# Patient Record
Sex: FEMALE | Race: WHITE | Employment: FULL TIME | URBAN - METROPOLITAN AREA
[De-identification: names, ages, dates, MRNs, and addresses within clinical notes are randomized per-mention and may not be internally consistent; named-entity substitution may affect disease eponyms.]

---

## 2017-05-05 ENCOUNTER — TRANSFERRED RECORDS (OUTPATIENT)
Dept: HEALTH INFORMATION MANAGEMENT | Facility: CLINIC | Age: 22
End: 2017-05-05

## 2017-05-05 LAB — PAP-ABSTRACT: NORMAL

## 2018-05-22 ENCOUNTER — HEALTH MAINTENANCE LETTER (OUTPATIENT)
Age: 23
End: 2018-05-22

## 2018-08-03 ENCOUNTER — OFFICE VISIT (OUTPATIENT)
Dept: FAMILY MEDICINE | Facility: CLINIC | Age: 23
End: 2018-08-03
Payer: COMMERCIAL

## 2018-08-03 VITALS
SYSTOLIC BLOOD PRESSURE: 122 MMHG | HEART RATE: 110 BPM | BODY MASS INDEX: 30.08 KG/M2 | DIASTOLIC BLOOD PRESSURE: 54 MMHG | OXYGEN SATURATION: 99 % | TEMPERATURE: 97.7 F | HEIGHT: 66 IN | WEIGHT: 187.2 LBS

## 2018-08-03 DIAGNOSIS — Z23 ENCOUNTER FOR IMMUNIZATION: ICD-10-CM

## 2018-08-03 DIAGNOSIS — E28.2 PCOS (POLYCYSTIC OVARIAN SYNDROME): Primary | ICD-10-CM

## 2018-08-03 DIAGNOSIS — E66.811 CLASS 1 OBESITY DUE TO EXCESS CALORIES WITHOUT SERIOUS COMORBIDITY WITH BODY MASS INDEX (BMI) OF 30.0 TO 30.9 IN ADULT: ICD-10-CM

## 2018-08-03 DIAGNOSIS — Z30.41 ORAL CONTRACEPTIVE PILL SURVEILLANCE: ICD-10-CM

## 2018-08-03 DIAGNOSIS — F41.9 ANXIETY: ICD-10-CM

## 2018-08-03 DIAGNOSIS — E66.09 CLASS 1 OBESITY DUE TO EXCESS CALORIES WITHOUT SERIOUS COMORBIDITY WITH BODY MASS INDEX (BMI) OF 30.0 TO 30.9 IN ADULT: ICD-10-CM

## 2018-08-03 PROCEDURE — 90471 IMMUNIZATION ADMIN: CPT | Performed by: FAMILY MEDICINE

## 2018-08-03 PROCEDURE — 90636 HEP A/HEP B VACC ADULT IM: CPT | Performed by: FAMILY MEDICINE

## 2018-08-03 PROCEDURE — 99214 OFFICE O/P EST MOD 30 MIN: CPT | Mod: 25 | Performed by: FAMILY MEDICINE

## 2018-08-03 RX ORDER — ESCITALOPRAM OXALATE 10 MG/1
5 TABLET ORAL DAILY
Qty: 45 TABLET | Refills: 3 | Status: SHIPPED | OUTPATIENT
Start: 2018-08-03 | End: 2019-02-26

## 2018-08-03 RX ORDER — DESOGESTREL AND ETHINYL ESTRADIOL 0.15-0.03
1 KIT ORAL
COMMUNITY
Start: 2018-04-16 | End: 2018-08-03

## 2018-08-03 RX ORDER — DESOGESTREL AND ETHINYL ESTRADIOL 0.15-0.03
1 KIT ORAL DAILY
Qty: 84 TABLET | Refills: 3 | Status: SHIPPED | OUTPATIENT
Start: 2018-08-03 | End: 2019-02-26

## 2018-08-03 RX ORDER — ESCITALOPRAM OXALATE 10 MG/1
5 TABLET ORAL
COMMUNITY
Start: 2018-04-16 | End: 2018-08-03

## 2018-08-03 RX ORDER — ALBUTEROL SULFATE 90 UG/1
2 AEROSOL, METERED RESPIRATORY (INHALATION)
COMMUNITY
Start: 2017-05-28 | End: 2018-08-03

## 2018-08-03 ASSESSMENT — PAIN SCALES - GENERAL: PAINLEVEL: NO PAIN (0)

## 2018-08-03 NOTE — MR AVS SNAPSHOT
"              After Visit Summary   8/3/2018    Neda Morley    MRN: [id number]           Patient Information     Date Of Birth          1995        Visit Information        Provider Department      8/3/2018 9:00 AM Mis Denny MD [facility]        Today's Diagnoses     Encounter for routine adult health examination without abnormal findings    -  1    PCOS (polycystic ovarian syndrome)        Anxiety        Oral contraceptive pill surveillance           Follow-ups after your visit        Your next 10 appointments already scheduled     Sep 04, 2018  5:00 PM CDT   Nurse Only with AGUSTO ORELLANA MA   [facility] ([facility]    [address] [place] [id number] [phone]              Who to contact     If you have questions or need follow up information about today's clinic visit or your schedule please contact [facility] directly at [phone].  Normal or non-critical lab and imaging results will be communicated to you by MyChart, letter or phone within 4 business days after the clinic has received the results. If you do not hear from us within 7 days, please contact the clinic through MyChart or phone. If you have a critical or abnormal lab result, we will notify you by phone as soon as possible.  Submit refill requests through [employer] or call your pharmacy and they will forward the refill request to us. Please allow 3 business days for your refill to be completed.          Additional Information About Your Visit        Care EveryWhere ID     This is your Care EveryWhere ID. This could be used by other organizations to access your [place] medical records  [id number]        Your Vitals Were     Pulse Temperature Height Last Period Pulse Oximetry Breastfeeding?    110 97.7  F (36.5  C) (Temporal) 5' 6\" (1.676 m) 07/10/2018 (Approximate) 99% No    BMI (Body Mass Index)                   30.21 kg/m2            " Blood Pressure from Last 3 Encounters:   08/03/18 122/54    Weight from Last 3 Encounters:   08/03/18 187 lb 3.2 oz (84.9 kg)              Today, you had the following     No orders found for display         Today's Medication Changes          These changes are accurate as of 8/3/18  9:54 AM.  If you have any questions, ask your nurse or doctor.               These medicines have changed or have updated prescriptions.        Dose/Directions    desogestrel-ethinyl estradiol 0.15-30 MG-MCG per tablet   Commonly known as:  JULEBER   This may have changed:  when to take this   Used for:  PCOS (polycystic ovarian syndrome), Oral contraceptive pill surveillance   Changed by:  Mis Denny MD        Dose:  1 tablet   Take 1 tablet by mouth daily   Quantity:  84 tablet   Refills:  3       escitalopram 10 MG tablet   Commonly known as:  LEXAPRO   This may have changed:  when to take this   Used for:  Anxiety   Changed by:  Mis Denny MD        Dose:  5 mg   Take 0.5 tablets (5 mg) by mouth daily   Quantity:  45 tablet   Refills:  3            Where to get your medicines      These medications were sent to [address]  [address], [facility] [address]     Phone:  [phone]     desogestrel-ethinyl estradiol 0.15-30 MG-MCG per tablet    escitalopram 10 MG tablet                Primary Care Provider Office Phone # Fax #    Mis Denny [phone] [address] [facility]   [facility] [address]        Equal Access to Services     BEBO NICE AH: Hadii estrellita ku hadasho Sosuali, waaxda luqadaha, qaybta kaalmada adeegyada, daysi sun. So [facility] [phone].    ATENCIÓN: Si habla español, tiene a diaz disposición servicios gratuitos de asistencia lingüística. Llame al [phone].    We comply with applicable federal civil rights laws and [place] laws. We do not discriminate on the basis of race, color,  national origin, age, disability, sex, sexual orientation, or gender identity.            Thank you!     Thank you for choosing [facility]  for your care. Our goal is always to provide you with excellent care. Hearing back from our patients is one way we can continue to improve our services. Please take a few minutes to complete the written survey that you may receive in the mail after your visit with us. Thank you!             Your Updated Medication List - Protect others around you: Learn how to safely use, store and throw away your medicines at www.disposemymeds.org.          This list is accurate as of 8/3/18  9:54 AM.  Always use your most recent med list.                   Brand Name Dispense Instructions for use Diagnosis    desogestrel-ethinyl estradiol 0.15-30 MG-MCG per tablet    JULEBER    84 tablet    Take 1 tablet by mouth daily    PCOS (polycystic ovarian syndrome), Oral contraceptive pill surveillance       escitalopram 10 MG tablet    LEXAPRO    45 tablet    Take 0.5 tablets (5 mg) by mouth daily    Anxiety

## 2018-08-08 PROBLEM — F41.9 ANXIETY: Status: ACTIVE | Noted: 2018-08-08

## 2018-08-08 PROBLEM — Z30.41 ORAL CONTRACEPTIVE PILL SURVEILLANCE: Status: ACTIVE | Noted: 2018-08-08

## 2018-08-08 PROBLEM — E66.09 CLASS 1 OBESITY DUE TO EXCESS CALORIES WITHOUT SERIOUS COMORBIDITY WITH BODY MASS INDEX (BMI) OF 30.0 TO 30.9 IN ADULT: Status: ACTIVE | Noted: 2018-08-08

## 2018-08-08 PROBLEM — E28.2 PCOS (POLYCYSTIC OVARIAN SYNDROME): Status: ACTIVE | Noted: 2018-08-08

## 2018-08-08 PROBLEM — E66.811 CLASS 1 OBESITY DUE TO EXCESS CALORIES WITHOUT SERIOUS COMORBIDITY WITH BODY MASS INDEX (BMI) OF 30.0 TO 30.9 IN ADULT: Status: ACTIVE | Noted: 2018-08-08

## 2018-08-08 NOTE — PROGRESS NOTES
"SUBJECTIVE:  Neda Morley, a 23 year old female scheduled an appointment to establish care with new MD and to discuss the following issues:     PCOS (polycystic ovarian syndrome)  Anxiety  Oral contraceptive pill surveillance  Encounter for immunization  Class 1 obesity due to excess calories without serious comorbidity with body mass index (BMI) of 30.0 to 30.9 in adult    Medical, social, surgical, and family histories reviewed.    She is a .  Generally healthy. She is in a relationship with 1 partner for about 10 months.  She has never been pregnant, no concerns for or symptoms of STDs.    She is on OCPs, needs a refill. She has done Depo in the past, for over a year, and didn't like the side effects. She stopped it in Jan or Feb and is doing well on OCPs.  Will need a refill.   She is on Lexapro for anxiety, it is working well. She previously used Wellbutrin without relief.      ROS:  Constitutional, neuro, ENT, endocrine, pulmonary, cardiac, gastrointestinal, genitourinary, musculoskeletal, integument and psychiatric systems are negative, except as otherwise noted.    OBJECTIVE:  /54  Pulse 110  Temp 97.7  F (36.5  C) (Temporal)  Ht 5' 6\" (1.676 m)  Wt 187 lb 3.2 oz (84.9 kg)  LMP 07/10/2018 (Approximate)  SpO2 99%  Breastfeeding? No  BMI 30.21 kg/m2  EXAM:  Vitals noted.  Patient alert, oriented, and in no acute distress.   Neck:  Supple without lymphadenopathy, JVD or masses.   CV:  RRR without murmur.   Respiratory:  Lungs clear to auscultation bilaterally.     Orders Placed This Encounter   Procedures     HEPA/HEPB VACCINE ADULT IM     ADMIN 1st VACCINE        ASSESSMENT/PLAN:  (E28.2) PCOS (polycystic ovarian syndrome)  (primary encounter diagnosis)  Comment: stable.    Plan: desogestrel-ethinyl estradiol (JULEBER) 0.15-30        MG-MCG per tablet        Refilled her medication. Encouraged weight loss now and lifelong, no desire for fertility at this time.      (F41.9) " Anxiety  Comment: stable on Lexapro  Plan: escitalopram (LEXAPRO) 10 MG tablet        Refills given.     (Z30.41) Oral contraceptive pill surveillance  Comment: as above. Stable.   Plan: desogestrel-ethinyl estradiol (JULEBER) 0.15-30        MG-MCG per tablet        Refills given. Recommend yearly follow up. Pap due in 2020 per OB/GYN.     (Z23) Encounter for immunization  Comment: see above  Plan: HEPA/HEPB VACCINE ADULT IM, ADMIN 1st VACCINE        Completing Hep A series. At time of visit I was unaware that she has completed Hep B series, so we discussed pros and cons and she opted to receive this. I have since learned that she has completed Hep B series so no further vaccines needed.        (E66.09,  Z68.30) Class 1 obesity due to excess calories without serious comorbidity with body mass index (BMI) of 30.0 to 30.9 in adult  Comment: as above.  Plan: recommend lifetime healthy habits with increased exercise, improved diet for weight loss.      Mis Denny MD

## 2018-09-04 ENCOUNTER — TELEPHONE (OUTPATIENT)
Dept: FAMILY MEDICINE | Facility: CLINIC | Age: 23
End: 2018-09-04

## 2018-09-04 NOTE — TELEPHONE ENCOUNTER
Called to let patient know she didn't need to come in for any immunizations patient is up to date.  Dana Griffin MA 9/4/2018

## 2018-10-19 ENCOUNTER — TELEPHONE (OUTPATIENT)
Dept: FAMILY MEDICINE | Facility: CLINIC | Age: 23
End: 2018-10-19

## 2018-10-19 NOTE — TELEPHONE ENCOUNTER
"Neda Morley is a 23 year old female who calls with heavier than normal bleeding. Pt reports her period started two weeks ago and came back and has been heavy for 3 days. She states it is \"worse than a normal period,\" states saturating a pad an hour, reports a migraine x2 days, feels \"dehyrdated,\" \"groggy,\" and fatigued. Pt then stated she is \"multi-tasking\" and had to go. She states she will call back today around 4:30.   Huddled with Dr. Denny prior to calling patient who stated if patient missed pills she should finish the month of pills and not catch up on missed pills and to advise her that she will not be protected from pregnancy until finishing the next pack of pills and to use condoms during sex for protection.   Unable to complete triage of patient's symptoms and will anticipate call back from patient today.     NURSING PLAN: Huddle with provider, plan includes      Guideline used: Vaginal Bleeding  Telephone Triage Protocols for Nurses, Fifth Edition, Leora Stephenson RN    "

## 2018-10-19 NOTE — TELEPHONE ENCOUNTER
Reason for Call:  Patient missed 3 birth control pills     Detailed comments: Patient missed 3 birth control pills and now has experienced heavier than usual bleeding for 3 days. Patient would like Dr Denny or her nurse to call and discuss.     Phone Number Patient can be reached at: Cell number on file:    Telephone Information:   Mobile [phone]       Best Time: any    Can we leave a detailed message on this number? YES    Call taken on 10/19/2018 at 12:42 PM by Emma Zavala

## 2018-10-19 NOTE — TELEPHONE ENCOUNTER
Pt called back to discuss symptoms discussed in previous triage message. Huddled with Dr. Denny and informed pt that the heavy withdrawal bleed is likely due to missing three pills last week and to use other methods of contraception until the end of the next pill pack. Informed pt to increase fluids as well as she stated she is feeling dehydrated. Pt denies pain now but states the last couple days her cramping was about a 7/10. Instructed pt to increase fluids, rest, and monitor symptoms. If bleeding continues this heavy where she is saturating a pad an hour to seek care in the ED. Pt states she does not have major concerns but wondered why the bleeding was so heavy. Pt agrees to go to ED over the weekend if symptoms persist or worsen.    Haven Stephenson RN

## 2019-01-30 ENCOUNTER — TELEPHONE (OUTPATIENT)
Dept: FAMILY MEDICINE | Facility: CLINIC | Age: 24
End: 2019-01-30

## 2019-01-31 NOTE — TELEPHONE ENCOUNTER
Yes she can have the IUD Placed at that time. She needs to have NO unprotected sex in the 2 weeks prior to appt to have no chance of pregnancy at that time, and will have to take a pregnancy test at that time.   Mis Denny MD

## 2019-01-31 NOTE — TELEPHONE ENCOUNTER
Reason for Call:  Other call back    Detailed comments: Patient has an appt on 2/26/19 for a consult but she would like to have the IUD placed. Patient feels that she doesn't need the consult. Please call her to conform that she can have the IUD placed during the appt.     Phone Number Patient can be reached at: Cell number on file:    Telephone Information:   Mobile [phone]       Best Time: any    Can we leave a detailed message on this number? YES    Call taken on 1/30/2019 at 6:23 PM by Tala Rosales

## 2019-02-26 ENCOUNTER — OFFICE VISIT (OUTPATIENT)
Dept: FAMILY MEDICINE | Facility: CLINIC | Age: 24
End: 2019-02-26
Payer: COMMERCIAL

## 2019-02-26 VITALS
SYSTOLIC BLOOD PRESSURE: 122 MMHG | DIASTOLIC BLOOD PRESSURE: 58 MMHG | TEMPERATURE: 98.6 F | BODY MASS INDEX: 30.73 KG/M2 | HEART RATE: 90 BPM | WEIGHT: 191.2 LBS | HEIGHT: 66 IN | OXYGEN SATURATION: 96 % | RESPIRATION RATE: 14 BRPM

## 2019-02-26 DIAGNOSIS — Z30.430 ENCOUNTER FOR IUD INSERTION: Primary | ICD-10-CM

## 2019-02-26 LAB — HCG UR QL: NEGATIVE

## 2019-02-26 PROCEDURE — 58300 INSERT INTRAUTERINE DEVICE: CPT | Performed by: FAMILY MEDICINE

## 2019-02-26 PROCEDURE — 81025 URINE PREGNANCY TEST: CPT | Performed by: FAMILY MEDICINE

## 2019-02-26 PROCEDURE — 87491 CHLMYD TRACH DNA AMP PROBE: CPT | Performed by: FAMILY MEDICINE

## 2019-02-26 PROCEDURE — 87591 N.GONORRHOEAE DNA AMP PROB: CPT | Performed by: FAMILY MEDICINE

## 2019-02-26 ASSESSMENT — MIFFLIN-ST. JEOR: SCORE: 1649.41

## 2019-02-26 ASSESSMENT — PAIN SCALES - GENERAL: PAINLEVEL: NO PAIN (0)

## 2019-02-26 NOTE — PROGRESS NOTES
"IUD Insertion:  CONSULT:    Is a pregnancy test required: Yes.  Was it positive or negative?  Negative  Was a consent obtained?  Yes    Subjective: Neda Morley is a 23 year old  presents for IUD and desires Mirena type IUD.  We discussed all available options including Anna, Kyleena, Mirena, and Paragard.     Patient has been given the opportunity to ask questions about all forms of birth control, including all options appropriate for Neda Morley. Discussed that no method of birth control, except abstinence is 100% effective against pregnancy or sexually transmitted infection. She is forgetful about taking pills, and had side effects from Depo Provera.     Neda Morley understands she may have the IUD removed at any time. IUD should be removed by a health care provider.    The entire insertion procedure was reviewed with the patient, including care after placement.    No LMP recorded (lmp unknown). Patient is not currently having periods (Reason: Irregular Periods). Has current contraception. No allergy to betadine or shellfish. Patient desires STD screening  HCG Qual Urine   Date Value Ref Range Status   2019 Negative NEG^Negative Final     Comment:     This test is for screening purposes.  Results should be interpreted along with   the clinical picture.  Confirmation testing is available if warranted by   ordering DKF722, HCG Quantitative Pregnancy.           /58   Pulse 90   Temp 98.6  F (37  C) (Temporal)   Resp 14   Ht 1.685 m (5' 6.34\")   Wt 86.7 kg (191 lb 3.2 oz)   LMP  (LMP Unknown)   SpO2 96%   Breastfeeding? No   BMI 30.55 kg/m      Vitals noted.  Patient alert, oriented, and in no acute distress.   Abdomen:  Soft, nontender, nondistended with good bowel sounds and no masses or hepatosplenomegaly.   EG/BUS: normal genital architecture without lesions, erythema or abnormal secretions.   Vagina: moist, pink, rugae with physiologic discharge and " secretions  Cervix: nulliparous no lesions and pink, moist, closed, without lesion or CMT  Uterus: midposition, mobile, no pain  Adnexa: within normal limits and no masses, nodularity, tenderness    PROCEDURE NOTE: -- IUD Insertion    Reason for Insertion: contraception    Under sterile technique, cervix was visualized with speculum, genprobe collected, cervix prepped with Betadine solution swab x 3. Tenaculum was placed for stability. The uterus was gently straightened and sounded to 8.0 cm. IUD prepared for placement, and IUD inserted according to 's instructions without difficulty or significant resitance, and deployed at the fundus. The strings were visualized and trimmed to 5.0 cm from the external os. Tenaculum was removed and hemostasis noted. Speculum removed.  Patient tolerated procedure well. She felt faint afterward for a few minutes but was allowed to sit up slowly, was given some juice, crackers and time, and recovered nicely.      Lot # [id number]  Exp: 4/30/21    EBL: minimal    Complications: none    ASSESSMENT:     ICD-10-CM    1. Encounter for IUD insertion Z30.430 HCG Qual, Urine ([id number])     HCG Qual, Urine ([id number])     NEISSERIA GONORRHOEA PCR     CHLAMYDIA TRACHOMATIS PCR     levonorgestrel (MIRENA) 20 MCG/24HR IUD 20 mcg     INSERTION INTRAUTERINE DEVICE        PLAN:    Given 's handouts, including when to have IUD removed, list of danger s/sx, side effects and follow up recommended. Encouraged condom use for prevention of STD. Back up contraception advised for 7 days if progestin method. Advised to call for any fever, for prolonged or severe pain or bleeding, abnormal vaginal discharge, or unable to palpate strings. She was advised to use pain medications (ibuprofen) as needed for mild to moderate pain. Advised to follow-up in clinic in 4-6 weeks for IUD string check if unable to find strings or as directed by provider.     Mis Denny MD

## 2019-02-27 LAB
C TRACH DNA SPEC QL NAA+PROBE: NEGATIVE
N GONORRHOEA DNA SPEC QL NAA+PROBE: NEGATIVE
SPECIMEN SOURCE: NORMAL
SPECIMEN SOURCE: NORMAL

## 2019-03-22 ENCOUNTER — OFFICE VISIT (OUTPATIENT)
Dept: URGENT CARE | Facility: RETAIL CLINIC | Age: 24
End: 2019-03-22
Payer: COMMERCIAL

## 2019-03-22 VITALS
DIASTOLIC BLOOD PRESSURE: 82 MMHG | OXYGEN SATURATION: 96 % | SYSTOLIC BLOOD PRESSURE: 120 MMHG | HEART RATE: 79 BPM | TEMPERATURE: 98.4 F

## 2019-03-22 DIAGNOSIS — H61.23 CERUMINOSIS, BILATERAL: ICD-10-CM

## 2019-03-22 DIAGNOSIS — B34.9 VIRAL SYNDROME: Primary | ICD-10-CM

## 2019-03-22 PROCEDURE — 99213 OFFICE O/P EST LOW 20 MIN: CPT | Performed by: FAMILY MEDICINE

## 2019-03-22 NOTE — PATIENT INSTRUCTIONS
"  Patient Education     Viral Syndrome (Adult)  A viral illness may cause a number of symptoms such as fever. Other symptoms depend on the part of the body that the virus affects. If it settles in your nose, throat, and lungs, it may cause cough, sore throat, congestion, runny nose, headache, earache and other ear symptoms, or shortness of breath. If it settles in your stomach and intestinal tract, it may cause nausea, vomiting, cramping, and diarrhea. Sometimes it causes generalized symptoms like \"aching all over,\" feeling tired, loss of energy, or loss of appetite.  A viral illness usually lasts anywhere from several days to several weeks, but sometimes it lasts longer. In some cases, a more serious infection can look like a viral syndrome in the first few days of the illness. You may need another exam and additional tests to know the difference. Watch for the warning signs listed below for when to seek medical advice.  Home care  Follow these guidelines for taking care of yourself at home:    If symptoms are severe, rest at home for the first 2 to 3 days.    Stay away from cigarette smoke - both your smoke and the smoke from others.    You may use over-the-counter acetaminophen or ibuprofen for fever, muscle aching, and headache, unless another medicine was prescribed for this. If you have chronic liver or kidney disease or ever had a stomach ulcer or gastrointestinal bleeding, talk with your healthcare provider before using these medicines. No one who is younger than 18 and ill with a fever should take aspirin. It may cause severe disease or death.    Your appetite may be poor, so a light diet is fine. Avoid dehydration by drinking 8 to 12, 8-ounce glasses of fluids each day. This may include water; orange juice; lemonade; apple, grape, and cranberry juice; clear fruit drinks; electrolyte replacement and sports drinks; and decaffeinated teas and coffee. If you have been diagnosed with a kidney disease, ask your " healthcare provider how much and what types of fluids you should drink to prevent dehydration. If you have kidney disease, drinking too much fluid can cause it build up in the your body and be dangerous to your health.    Over-the-counter remedies won't shorten the length of the illness but may be helpful for symptoms such as cough, sore throat, nasal and sinus congestion, or diarrhea. Don't use decongestants if you have high blood pressure.  Follow-up care  Follow up with your healthcare provider if you do not improve over the next week.  Call 911  Call 911 if any of the following occur:    Convulsion    Feeling weak, dizzy, or like you are going to faint    Chest pain, or more than mild shortness of breath   When to seek medical advice  Call your healthcare provider right away if any of these occur:    Cough with lots of colored sputum (mucus) or blood in your sputum    Chest pain, shortness of breath, wheezing, or trouble breathing    Severe headache; face, neck, or ear pain    Severe, constant pain in the lower right side of your belly (abdominal)    Continued vomiting (can t keep liquids down)    Frequent diarrhea (more than 5 times a day); blood (red or black color) or mucus in diarrhea    Feeling weak, dizzy, or like you are going to faint    Extreme thirst    Fever of 100.4 F (38 C) or higher, or as directed by your healthcare provider  Date Last Reviewed: 4/1/2018 2000-2018 [employer]. [address] [place] [address]. All rights reserved. This information is not intended as a substitute for professional medical care. Always follow your healthcare professional's instructions.           Patient Education       Earwax, Home Treatment    Everyone produces earwax from the lining of the ear canal. It serves to lubricate and protect the ear. The wax that forms in the canal naturally moves toward the outside of the ear and falls out. Sometimes the ear canal may contain too much wax. This  can cause a blockage and loss of hearing. Directions are given below for home treatment.  Home care  If your doctor has advised you to remove a wax blockage yourself, follow these directions:    Unless a medicine was prescribed, you may use an over-the-counter product made for clearing earwax. These contain carbamide peroxide. Lie down with the blocked ear facing upward. Apply one dropper full of medicine and wait a few minutes. Grasp the outer ear and wiggle it to help the solution enter the canal.    Lean over a sink or basin with the blocked ear facing downward. Use a bulb syringe filled with warm (not hot or cold) water to rinse the ear several times. Use gentle pressure only.    If you are having trouble draining the water out of your ear canal, put a few drops of rubbing alcohol (isopropyl alcohol) into the ear canal. This will help remove the remaining water.    Repeat this procedure once a day for up to three days, or until your hearing is back to normal. Do not use this treatment for more than three days in a row.  Don ts    Don t use cold water to rinse the ear. This will make you dizzy.    Don t perform this procedure if you have an ear infection.    Don t perform this procedure if you have a ruptured eardrum.    Don t use cotton swabs, matches, hairpins, keys, or other objects to  clean  the ear canal. This can cause infection of the ear canal or rupture the eardrum. Because of their size and shape, cotton swabs can push earwax deeper into the ear canal instead of removing it.  Follow-up care  Follow up with your health care provider if you are not improving after three cleaning attempts, or as advised.  When to seek medical advice  Call your health care provider right away if any of these occur:    Worsening ear pain    Fever of 101 F (38.3 C) or higher, or as directed by your health care provider    Hearing does not return to normal after three days of treatment    Fluid drainage or bleeding from the  ear canal    Swelling, redness, or tenderness of the outer ear    Headache, neck pain, or stiff neck    1027-7899 [employer]. [address] [place] [address], [place] [address]. All rights reserved. This information is not intended as a substitute for professional medical care. Always follow your healthcare professional's instructions.

## 2019-03-22 NOTE — PROGRESS NOTES
SUBJECTIVE:   Neda Morley is a 23 year old female presenting with a chief complaint of stuffy nose, cough - productive, facial pain/pressure and fatigue.  Onset of symptoms was 1 week(s) ago.  Course of illness is same.    Severity moderate  Current and Associated symptoms: ear pain bilateral  Treatment measures tried include Tylenol/Ibuprofen and OTC Cough med.  Predisposing factors include works with public.    History reviewed. No pertinent past medical history.  No current outpatient medications on file.     Social History     Tobacco Use     Smoking status: Never Smoker     Smokeless tobacco: Never Used   Substance Use Topics     Alcohol use: Yes     Comment: socially       ROS:  Review of systems negative except as stated above.    OBJECTIVE:  /82   Pulse 79   Temp 98.4  F (36.9  C) (Oral)   LMP  (LMP Unknown)   SpO2 96%   GENERAL APPEARANCE: healthy, alert and no distress  EYES: EOMI,  PERRL, conjunctiva clear  HENT: cerumen bilateral  NECK: supple, nontender, no lymphadenopathy  RESP: lungs clear to auscultation - no rales, rhonchi or wheezes  CV: regular rates and rhythm, normal S1 S2, no murmur noted  ABDOMEN:  soft, nontender, no HSM or masses and bowel sounds normal  NEURO: Normal strength and tone, sensory exam grossly normal,  normal speech and mentation  SKIN: no suspicious lesions or rashes    ASSESSMENT:  Viral syndrome, Viral upper respiratory illness and ceruminosis    PLAN:  Tylenol, Ibuprofen, Fluids, Rest and flonase. Printed material on homecare of ceruminosis  See orders in Epic

## 2019-03-26 ENCOUNTER — OFFICE VISIT (OUTPATIENT)
Dept: FAMILY MEDICINE | Facility: CLINIC | Age: 24
End: 2019-03-26
Payer: COMMERCIAL

## 2019-03-26 VITALS
HEART RATE: 88 BPM | SYSTOLIC BLOOD PRESSURE: 122 MMHG | OXYGEN SATURATION: 99 % | WEIGHT: 194.1 LBS | TEMPERATURE: 97.3 F | DIASTOLIC BLOOD PRESSURE: 60 MMHG | BODY MASS INDEX: 31.01 KG/M2 | RESPIRATION RATE: 18 BRPM

## 2019-03-26 DIAGNOSIS — H61.23 CERUMINOSIS, BILATERAL: Primary | ICD-10-CM

## 2019-03-26 PROCEDURE — 99213 OFFICE O/P EST LOW 20 MIN: CPT | Performed by: NURSE PRACTITIONER

## 2019-03-26 ASSESSMENT — PAIN SCALES - GENERAL: PAINLEVEL: SEVERE PAIN (6)

## 2019-03-26 NOTE — PROGRESS NOTES
SUBJECTIVE:   Neda Morley is a 23 year old female who presents to clinic today for the following health issues:      ED/UC Followup:    Facility:  [facility]  Date of visit: 3/22/19  Reason for visit: ear pain  Current Status: not better, increased pressure     She was diagnosed with a viral upper respiratory illness and ceruminosis.  She was instructed on symptomatic measures for management of her cold and home care management of ceruminosis    Patient states she has put some oil in her ears to see if that would help clear them.  Her other symptoms of the viral respiratory infection are clearing up without problem.  The left ear is especially troublesome.  She states she is unable to hearing from it at all and it is ringing.    Problem list and histories reviewed & adjusted, as indicated.  Additional history: as documented    BP Readings from Last 3 Encounters:   03/26/19 122/60   03/22/19 120/82   02/26/19 122/58    Wt Readings from Last 3 Encounters:   03/26/19 88 kg (194 lb 1.6 oz)   02/26/19 86.7 kg (191 lb 3.2 oz)   08/03/18 84.9 kg (187 lb 3.2 oz)                    Reviewed and updated as needed this visit by clinical staff  Tobacco  Allergies  Meds  Med Hx  Surg Hx  Fam Hx  Soc Hx      Reviewed and updated as needed this visit by Provider         ROS:  Constitutional, HEENT, cardiovascular, pulmonary, gi and gu systems are negative, except as otherwise noted.    OBJECTIVE:     /60   Pulse 88   Temp 97.3  F (36.3  C) (Temporal)   Resp 18   Wt 88 kg (194 lb 1.6 oz)   LMP  (LMP Unknown)   SpO2 99%   BMI 31.01 kg/m    Body mass index is 31.01 kg/m .   GENERAL: healthy, alert and no distress  EYES: Eyes grossly normal to inspection, PERRL and conjunctivae and sclerae normal  HENT: Both ear canals are completely occluded by cerumen.  Oropharynx unremarkable.  No sinus tenderness.  NECK: no adenopathy, no asymmetry, masses, or scars and thyroid normal to palpation  RESP: lungs clear  to auscultation - no rales, rhonchi or wheezes  CV: regular rates and rhythm, normal S1 S2, no S3 or S4 and no murmur, click or rub    Following tap water lavage patient reports having normal hearing, no longer has any ringing in her ears.    ASSESSMENT/PLAN:     Problem List Items Addressed This Visit     None      Visit Diagnoses     Ceruminosis, bilateral    -  Primary           She admits to using Q-tips for cleaning her ears.  She is discouraged from this practice, explaining that causes the cerumen to become more deeply embedded in the ear canal.  She had a little erythema of the left canal, possibly due to the irritation of the cerumen impaction and irrigation.  If she develops pain, I would be concerned for developing otitis externa.  She would contact the clinic.    EDGAR [facility]

## 2019-04-15 ENCOUNTER — OFFICE VISIT (OUTPATIENT)
Dept: FAMILY MEDICINE | Facility: OTHER | Age: 24
End: 2019-04-15
Payer: COMMERCIAL

## 2019-04-15 VITALS
HEART RATE: 88 BPM | TEMPERATURE: 97.6 F | BODY MASS INDEX: 31.14 KG/M2 | WEIGHT: 194.9 LBS | SYSTOLIC BLOOD PRESSURE: 120 MMHG | RESPIRATION RATE: 16 BRPM | DIASTOLIC BLOOD PRESSURE: 72 MMHG

## 2019-04-15 DIAGNOSIS — R51.9 HEADACHE DISORDER: ICD-10-CM

## 2019-04-15 DIAGNOSIS — Z23 NEED FOR PROPHYLACTIC VACCINATION AND INOCULATION AGAINST INFLUENZA: ICD-10-CM

## 2019-04-15 DIAGNOSIS — Z11.4 SCREENING FOR HIV (HUMAN IMMUNODEFICIENCY VIRUS): ICD-10-CM

## 2019-04-15 DIAGNOSIS — G50.0 TRIGEMINAL NEURALGIA: Primary | ICD-10-CM

## 2019-04-15 PROCEDURE — 99213 OFFICE O/P EST LOW 20 MIN: CPT | Performed by: PHYSICIAN ASSISTANT

## 2019-04-15 RX ORDER — CARBAMAZEPINE 200 MG/1
200 CAPSULE, EXTENDED RELEASE ORAL 2 TIMES DAILY
Qty: 60 CAPSULE | Refills: 0 | Status: SHIPPED | OUTPATIENT
Start: 2019-04-15 | End: 2020-07-20

## 2019-04-15 RX ORDER — RIZATRIPTAN BENZOATE 10 MG/1
10 TABLET, ORALLY DISINTEGRATING ORAL
Qty: 12 TABLET | Refills: 0 | Status: SHIPPED | OUTPATIENT
Start: 2019-04-15 | End: 2020-07-20

## 2019-04-15 ASSESSMENT — PAIN SCALES - GENERAL: PAINLEVEL: EXTREME PAIN (8)

## 2019-04-15 NOTE — PROGRESS NOTES
SUBJECTIVE:   Neda Morley is a 24 year old female who presents to clinic today for the following health issues:      HPI  Headache  Onset: 04/13    Description:   Location: All of left side   Character: throbbing pain  Frequency:  Daily  Duration:  1 hour to several hours     Intensity: severe    Progression of Symptoms:  same    Accompanying Signs & Symptoms:  Stiff neck: YES  Neck or upper back pain: YES  Fever: no  Sinus pressure: YES- Left side, Ear ringing Left  Nausea or vomiting: YES- Nausea  Dizziness: YES  Numbness: no  Weakness: no  Visual changes: YES- Dots when eyes are closed    History:   Head trauma: no  Family history of migraines: no  Previous tests for headaches: no  Neurologist evaluations: no  Able to do daily activities: YES- Patient left work today   Wake with a headaches: YES  Do headaches wake you up: YES  Daily pain medication use: YES  Work/school stressors/changes: YES- lots of work stress as a  in [employer]    Precipitating factors:   Does light make it worse: YES  Does sound make it worse: YES    Alleviating factors:  Does sleep help: no    Therapies Tried and outcome: Sudafed, water  and Excedrin  Additional history: as documented    Reviewed and updated as needed this visit by clinical staff         Reviewed and updated as needed this visit by Provider         Patient Active Problem List   Diagnosis     PCOS (polycystic ovarian syndrome)     Anxiety     Oral contraceptive pill surveillance     Class 1 obesity due to excess calories without serious comorbidity with body mass index (BMI) of 30.0 to 30.9 in adult     History reviewed. No pertinent surgical history.    Social History     Tobacco Use     Smoking status: Never Smoker     Smokeless tobacco: Never Used   Substance Use Topics     Alcohol use: Yes     Comment: socially     History reviewed. No pertinent family history.      Current Outpatient Medications   Medication Sig Dispense Refill      carBAMazepine (CARBATROL) 200 MG 12 hr capsule Take 1 capsule (200 mg) by mouth 2 times daily 60 capsule 0     levonorgestrel (MIRENA) 20 MCG/24HR IUD 1 each by Intrauterine route once       rizatriptan (MAXALT-MLT) 10 MG ODT Take 1 tablet (10 mg) by mouth at onset of headache for migraine 12 tablet 0     Allergies   Allergen Reactions     Sulfa Drugs Rash     No lab results found.   BP Readings from Last 3 Encounters:   04/15/19 120/72   03/26/19 122/60   03/22/19 120/82    Wt Readings from Last 3 Encounters:   04/15/19 88.4 kg (194 lb 14.4 oz)   03/26/19 88 kg (194 lb 1.6 oz)   02/26/19 86.7 kg (191 lb 3.2 oz)                  Labs reviewed in EPIC    ROS:  CONSTITUTIONAL: NEGATIVE for fever, chills, change in weight  INTEGUMENTARY/SKIN: NEGATIVE for worrisome rashes, moles or lesions  ENT/MOUTH: NEGATIVE for ear, mouth and throat problems  RESP: NEGATIVE for significant cough or SOB  CV: NEGATIVE for chest pain, palpitations or peripheral edema  GI: POSITIVE for nausea and poor appetite  MUSCULOSKELETAL: NEGATIVE for significant arthralgias or myalgia  NEURO: NEGATIVE for weakness, dizziness or paresthesias  ENDOCRINE: NEGATIVE for temperature intolerance, skin/hair changes  PSYCHIATRIC: NEGATIVE for changes in mood or affect    OBJECTIVE:     /72 (Cuff Size: Adult Regular)   Pulse 88   Temp 97.6  F (36.4  C) (Temporal)   Resp 16   Wt 88.4 kg (194 lb 14.4 oz)   BMI 31.14 kg/m     Body mass index is 31.14 kg/m .  GENERAL: healthy, alert and no distress  HENT: ear canals and TM's normal, nose and mouth without ulcers or lesions  NECK: no adenopathy, no asymmetry, masses, or scars and trachea midline and normal to palpation  RESP: lungs clear to auscultation - no rales, rhonchi or wheezes  CV: regular rate and rhythm, normal S1 S2, no S3 or S4, no murmur, click or rub, no peripheral edema and peripheral pulses strong  ABDOMEN: soft, nontender, no hepatosplenomegaly, no masses and bowel sounds normal  MS:  no gross musculoskeletal defects noted, no edema  SKIN: no suspicious lesions or rashes to visible skin.  NEURO: Normal strength and tone, mentation intact and speech normal  PSYCH: mentation appears normal, affect normal/bright    Diagnostic Test Results:  No results found for this or any previous visit (from the past 24 hour(s)).    ASSESSMENT/PLAN:     1. Trigeminal neuralgia  2. Headache disorder  Overall her presentation to me is not extremely consistent with migraine headache however this is within the realm of possibility and in the differential as well.  Family history is negative but she describes more of a lancinating pain and fairly classically to the 5th cranial nerve distribution on the left side of her scalp.  She states that to a certain extent she can massage her scalp and feel it change slightly as well.  She does note however a throbbing sensation, light sensitivity and sound sensitivity when she gets the headaches related to this.  At this point in time we will trial medication as noted below and she is to keep a headache diary for the next 3-4 weeks.  Follow-up in 3-4 weeks to see if medications are working or if we need to take a different look at diagnostics.  - carBAMazepine (CARBATROL) 200 MG 12 hr capsule; Take 1 capsule (200 mg) by mouth 2 times daily  Dispense: 60 capsule; Refill: 0  - rizatriptan (MAXALT-MLT) 10 MG ODT; Take 1 tablet (10 mg) by mouth at onset of headache for migraine  Dispense: 12 tablet; Refill: 0    3. Screening for HIV (human immunodeficiency virus)  4. Need for prophylactic vaccination and inoculation against influenza  Declines health maintenance intervention today.    Jim Banks PA-C  [facility]

## 2019-04-15 NOTE — PATIENT INSTRUCTIONS
Patient Education     Trigeminal Neuralgia  You have trigeminal neuralgia. This is pain caused by irritation of the trigeminal nerve on your face. Symptoms include sudden, sharp pain in your head or face. It may feel like an electric shock. It can last for several seconds or minutes. It usually happens on only 1 side of your face. Pain may be triggered by things like moving your jaw or a touch on the skin of your face. The pain may be caused by something irritating the trigeminal nerve, such as a blood vessel pressing against it. But the exact cause of this problem often isn t known. Although it can be quite painful, the condition isn t dangerous.  Trigeminal neuralgia is often treated with medicines. These include anti-seizure medicines or antidepressants. Certain other treatments may also help. In some cases, you may need surgery.    Home care  Your healthcare provider may prescribe medicines to help relieve and prevent pain. Take all medicines as directed. Please note that it may take several changes in dose and medicines before the right combination is found that controls the pain.  General care:    Plan to rest at home today.    Avoid any specific activities that seem to trigger the pain.    Over the next few weeks, keep a pain diary. Write down when your symptoms happen and how they feel. Certain activities such as touching your face, chewing, talking, or brushing your teeth may bring on the pain. Cold air can also trigger the pain. Make sure you write down any triggers and discuss these with your healthcare provider. This will help guide treatment.  Follow-up care  Follow up with your healthcare provider, or as advised. If you were referred to a neurologist, be sure to make an appointment.  For more information on your condition, visit:    F[employer] Association www.fpa-support.org  When to seek medical advice  Call your healthcare provider right away if any of these occur:    Fever of 100.4 F (38 C) or  higher, or as advised    Headache with very stiff neck    You aren t able to keep liquids down (repeated vomiting)    Extreme drowsiness or confusion    Dizziness or fainting    A new feeling of weakness or numbness or tingling in your arm, leg, or face    Difficulty speaking or seeing  Date Last Reviewed: 8/1/2016 2000-2018 [employer]. [address] [place] [address]. All rights reserved. This information is not intended as a substitute for professional medical care. Always follow your healthcare professional's instructions.           Patient Education   About Migraine Headaches  What is a migraine headache?  A migraine is a very painful type of headache. It may last a few hours or days. During a migraine, you may have vision problems and feel sick to your stomach.  Migraines are three times more common in women than in men. Once they start, you may get them for the rest of your life. They may occur less often as you age.  What causes it?  We don't know the exact cause, but many things can trigger a migraine. These include:    Stress and anxiety    Lack of food or sleep    Foods and drinks that contain tyramine, such as:  ? Red kris and some beers  ? Aged cheeses (like cheddar or blue cheese)  ? Yeast  ? Aged, dried or cured meats  ? Fermented foods like sauerkraut, soy sauce, miso and robinson chi    Too much light    Chemicals (gasoline, cleaning products, perfume, glue, etc.)    Weather changes    Certain medicines    Hormone changes (in women).  What are symptoms?  Some people can tell when they're about to have a migraine. They may see flashing lights or zigzag lines in front of their eyes. Or they may lose their vision for a short time.  With a migraine, you may:    Feel pain or pulsing on one side of the head. For some people, the entire head hurts.    Be very sensitive to light and sound.    Feel nauseated (sick to your stomach) and vomit (throw up).  How is it treated?  Your care  team may suggest medicine to prevent or relieve your symptoms. Once you start having migraines, you may also need medicine to keep them from getting worse.   Take your medicine at the first sign of a migraine. It may take several tries to find a medicine that works for you.   When a migraine comes:    Lie down in a quiet, dark room. Try not to bend over, as this may cause more pain.    Put a cold pack on your head. Try a bag of frozen vegetables, wrapped with a thin cloth.    Drink extra fluids. If you can't drink, suck on ice chips.  How can I prevent migraines?  It will help to keep a migraine diary. By keeping a diary, you may find the cause of your headaches. Once you know the cause, you can take steps to prevent migraines in the future.  It also helps to live a healthy lifestyle. This means:    Get regular exercise. (If exercise triggers your headaches, tell your doctor.)    Drink plenty of water.    Eat healthy meals at regular times.    Try to go to bed and get up and regular times.    Don't smoke. Avoid second-hand smoke.    Avoid caffeine. Coffee, tea and soda may help a migraine. But if you drink them too often, they can cause migraines.    Find ways to relax, have fun and reduce stress in your life.    Try complementary therapies (yoga, acupuncture, massage, biofeedback, etc.).  When should I call my clinic?  Call your clinic at once if you have new or unusual symptoms, such as:     Pain that gets worse or lasts more than 24 hours.    Slurred speech or problems talking.    A weak arm or leg that you can't move normally.    A fever over 100 F (37.8 C), taken under the tongue.    Stiff neck.    Vomiting (throwing up) for several hours.  For more information about migraines  Contact the [employer] [place] for Headache Education (ACHE) at 1-188.305.6789 or www.headaches.org.  Local providers of complementary therapies  These services may not be covered by insurance. Check your insurance plan.  [facility]  [phone]   Includes pain education, behavioral therapy,   trigger point injections and more.  [facility]   [phone]   Includes acupuncture, massage, myofascial release.  [facility]  [phone]  [email or web address]  Includes mindfulness, meditation, yoga.  Community Education     [place]: commed.mpls.[facility].mn.[facility]: commedprograms.[facility].org  Look for programs on yoga, mindfulness, etc.  [facility]   [phone]  www.[place]ways[place].org  Includes mindfulness, yoga, body-mind skills.  For informational purposes only. Not to replace the advice of your health care provider.   Copyright   2011 [facility]. All rights reserved. [employer] 416355 - 11/15.

## 2019-10-08 ENCOUNTER — TELEPHONE (OUTPATIENT)
Dept: FAMILY MEDICINE | Facility: CLINIC | Age: 24
End: 2019-10-08

## 2019-10-08 NOTE — TELEPHONE ENCOUNTER
Reason for Call:  Other call back    Detailed comments: Patient would like to discuss her hormonal issues.     Phone Number Patient can be reached at: Cell number on file:    Telephone Information:   Mobile [phone]       Best Time: any     Can we leave a detailed message on this number? YES    Call taken on 10/8/2019 at 5:31 PM by Raiza Henry

## 2019-10-09 NOTE — TELEPHONE ENCOUNTER
Called patient,  relayed message above, patient scheduled with another provider as Dr Denny was booked out until 01/29/2020        Nicole Chambers ~ [place] [address]  [email or web address]  [email or web address]  Office:  [phone]  Fax:  [phone]

## 2019-12-11 ENCOUNTER — TELEPHONE (OUTPATIENT)
Dept: FAMILY MEDICINE | Facility: CLINIC | Age: 24
End: 2019-12-11

## 2019-12-11 NOTE — TELEPHONE ENCOUNTER
Please abstract the following data from this visit with this patient into the appropriate field in Epic:    Tests that can be patient reported without a hard copy:    Pap smear done on this date: 05/05/2017 (approximately), by this group: Amanda, results in care everywhere.     Other Tests found in the patient's chart through Chart Review/Care Everywhere:        Note to [employer]: If this section is blank, no results were found via Chart Review/Care Everywhere.        Summary:    Patient is due/failing the following:   PAP    Action needed:   Patient needs office visit for PAP.    Type of outreach:    none per care everywhere UTD    Questions for provider review:    None                                                                                                                                    Alisson Jones CMA       Chart routed to Care Team .      Panel Management Review      Patient has the following on her problem list: None      Composite cancer screening  Chart review shows that this patient is due/due soon for the following Pap Smear    
no

## 2020-02-03 ENCOUNTER — APPOINTMENT (OUTPATIENT)
Dept: GENERAL RADIOLOGY | Facility: CLINIC | Age: 25
End: 2020-02-03
Attending: FAMILY MEDICINE
Payer: COMMERCIAL

## 2020-02-03 ENCOUNTER — HOSPITAL ENCOUNTER (EMERGENCY)
Facility: CLINIC | Age: 25
Discharge: HOME | End: 2020-02-03
Attending: FAMILY MEDICINE | Admitting: FAMILY MEDICINE
Payer: COMMERCIAL

## 2020-02-03 VITALS
DIASTOLIC BLOOD PRESSURE: 96 MMHG | TEMPERATURE: 98.8 F | RESPIRATION RATE: 19 BRPM | OXYGEN SATURATION: 99 % | SYSTOLIC BLOOD PRESSURE: 130 MMHG

## 2020-02-03 DIAGNOSIS — J18.9 PNEUMONIA OF LEFT LOWER LOBE DUE TO INFECTIOUS ORGANISM: ICD-10-CM

## 2020-02-03 DIAGNOSIS — J02.9 PHARYNGITIS, UNSPECIFIED ETIOLOGY: ICD-10-CM

## 2020-02-03 LAB
DEPRECATED S PYO AG THROAT QL EIA: NORMAL
SPECIMEN SOURCE: NORMAL

## 2020-02-03 PROCEDURE — 71046 X-RAY EXAM CHEST 2 VIEWS: CPT | Mod: TC

## 2020-02-03 PROCEDURE — 87880 STREP A ASSAY W/OPTIC: CPT | Performed by: FAMILY MEDICINE

## 2020-02-03 PROCEDURE — 87081 CULTURE SCREEN ONLY: CPT | Performed by: FAMILY MEDICINE

## 2020-02-03 PROCEDURE — 99284 EMERGENCY DEPT VISIT MOD MDM: CPT | Mod: 25 | Performed by: FAMILY MEDICINE

## 2020-02-03 PROCEDURE — 99284 EMERGENCY DEPT VISIT MOD MDM: CPT | Mod: Z6 | Performed by: FAMILY MEDICINE

## 2020-02-03 PROCEDURE — 25000125 ZZHC RX 250: Performed by: FAMILY MEDICINE

## 2020-02-03 PROCEDURE — 70360 X-RAY EXAM OF NECK: CPT | Mod: TC

## 2020-02-03 PROCEDURE — 99285 EMERGENCY DEPT VISIT HI MDM: CPT | Mod: 25

## 2020-02-03 RX ORDER — DEXAMETHASONE SODIUM PHOSPHATE 10 MG/ML
10 INJECTION, SOLUTION INTRAMUSCULAR; INTRAVENOUS ONCE
Status: COMPLETED | OUTPATIENT
Start: 2020-02-03 | End: 2020-02-03

## 2020-02-03 RX ORDER — AZITHROMYCIN 250 MG/1
TABLET, FILM COATED ORAL
Qty: 6 TABLET | Refills: 0 | Status: SHIPPED | OUTPATIENT
Start: 2020-02-03 | End: 2020-07-20

## 2020-02-03 RX ORDER — AMOXICILLIN 500 MG/1
1000 CAPSULE ORAL 3 TIMES DAILY
Qty: 40 CAPSULE | Refills: 0 | Status: SHIPPED | OUTPATIENT
Start: 2020-02-03 | End: 2020-07-20

## 2020-02-03 RX ADMIN — DEXAMETHASONE SODIUM PHOSPHATE 10 MG: 10 INJECTION, SOLUTION INTRAMUSCULAR; INTRAVENOUS at 01:59

## 2020-02-03 NOTE — ED AVS SNAPSHOT
[facility]  [address] [place] [address]  Phone:  [phone]  Fax:  [phone]                                    Neda Morley   MRN: [id number]    Department:  [facility] Emergency Department   Date of Visit:  2/3/2020           After Visit Summary Signature Page    I have received my discharge instructions, and my questions have been answered. I have discussed any challenges I see with this plan with the nurse or doctor.    ..........................................................................................................................................  Patient/Patient Representative Signature      ..........................................................................................................................................  Patient Representative Print Name and Relationship to Patient    ..................................................               ................................................  Date                                   Time    ..........................................................................................................................................  Reviewed by Signature/Title    ...................................................              ..............................................  Date                                               Time          22EPIC Rev 08/18

## 2020-02-03 NOTE — DISCHARGE INSTRUCTIONS
Take the amoxicillin and Zithromax as directed.  This should cover your pneumonia as well as anything bacterial going on in your throat.  We gave you a dose of Decadron in the ED.  That will help decrease the inflammation in your throat and upper airway.  It is a long-acting steroid so you do not need a prescription for that.  Delsym can help with the cough and Mucinex can help loosen the secretions.  Both are over-the-counter.  Encourage fluids.  Tylenol/ibuprofen as needed for fever/pain.  Popsicles, ice chips, ice cream, over-the-counter throat lozenges/sprays, etc. can all be helpful.  Recheck in clinic if persistent problems.  Return to the ED if worse/concerns.  It was nice visiting with both of you this evening.  I hope you feel better soon.  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    Thank you for choosing [facility]. We appreciate the opportunity to meet your urgent medical needs. Please let us know if we could have done anything to make your stay more satisfying.    After discharge, please closely monitor for any new or worsening symptoms. Return to the Emergency Department if you develop any acute worsening signs or symptoms.    If you had lab work, cultures or imaging studies done during your stay, the final results may still be pending. We will call you if your plan of care needs to change. However, if you are not improving as expected, please follow up with your primary care provider or clinic.     Start any prescription medications that were prescribed to you and take them as directed.     Please see additional handouts that may be pertinent to your condition.

## 2020-02-03 NOTE — ED PROVIDER NOTES
History     Chief Complaint   Patient presents with     Pharyngitis     HPI  Neda Morley is a 24 year old female who resents to the ED this morning with sore throat and cough.  She woke yesterday with a slight sore throat and mild cough but gradually worsened during the day.  She went to bed around 9 PM and then woke at 1130 with significant increase in her cough to the point of gagging and choking.  Was not able to really bring anything up and felt like she was having troubles breathing.  Sore throat is also worsened.  She took a couple of ibuprofen and she thinks that is helping as it is not as sore as it was before.  She denies any fevers.  No underlying lung disease.  Was around her coworkers all weekend and some of them were ill.  She works as a .  Basilio works at the [employer].    She is going to school for social work and getting her Masters.      Allergies:  Allergies   Allergen Reactions     Sulfa Drugs Rash       Problem List:    Patient Active Problem List    Diagnosis Date Noted     PCOS (polycystic ovarian syndrome) 08/08/2018     Priority: Medium     Anxiety 08/08/2018     Priority: Medium     Oral contraceptive pill surveillance 08/08/2018     Priority: Medium     Class 1 obesity due to excess calories without serious comorbidity with body mass index (BMI) of 30.0 to 30.9 in adult 08/08/2018     Priority: Medium        Past Medical History:    No past medical history on file.    Past Surgical History:    No past surgical history on file.    Family History:    No family history on file.    Social History:  Marital Status:  Single [1]  Social History     Tobacco Use     Smoking status: Never Smoker     Smokeless tobacco: Never Used   Substance Use Topics     Alcohol use: Yes     Comment: socially     Drug use: No        Medications:    amoxicillin (AMOXIL) 500 MG capsule  azithromycin (ZITHROMAX Z-AIDA) 250 MG tablet  carBAMazepine (CARBATROL) 200 MG 12 hr  capsule  levonorgestrel (MIRENA) 20 MCG/24HR IUD  rizatriptan (MAXALT-MLT) 10 MG ODT          Review of Systems   All other systems reviewed and are negative.      Physical Exam   BP: (!) 130/96  Heart Rate: 76  Temp: 98.8  F (37.1  C)  Resp: 19  SpO2: 99 %      Physical Exam  Constitutional:       Appearance: She is well-developed.      Comments: Frequent cough, dry   HENT:      Mouth/Throat:      Mouth: Mucous membranes are moist.      Pharynx: Uvula midline. No oropharyngeal exudate or posterior oropharyngeal erythema.   Neck:      Musculoskeletal: Normal range of motion.   Cardiovascular:      Rate and Rhythm: Normal rate and regular rhythm.   Pulmonary:      Effort: Pulmonary effort is normal.      Breath sounds: Normal breath sounds. No stridor.   Skin:     General: Skin is warm and dry.   Neurological:      Mental Status: She is alert.   Psychiatric:         Mood and Affect: Mood normal.         ED Course  (with Medical Decision Making)    24-year-old with mild sore throat and cough yesterday morning now worsened tonight.  Woke with severe coughing and choking.  Sore throat goes down into the neck.  No fevers.  I hear no stridor.  Her posterior pharynx looks good.  No swelling, erythema or exudate.  We will send a strep and will get a chest x-ray and soft tissue neck to look at her epiglottis.      Strep was negative.  Chest x-ray shows a small left posterior basilar infiltrate.  Soft tissue neck x-ray shows the epiglottis to be normal but there is some mild narrowing of the subglottic airway.  I gave her a dose of Decadron orally here in the ED which should help with that as well as the throat pain.  Will cover the pneumonia with amoxicillin and Zithromax.  If her throat culture comes back positive she would be adequately treated for that already.    We discussed reportable signs and when to return.  Verbal and written discharge instructions given.  She is comfortable with this plan.          Procedures                Critical Care time:  none               Results for orders placed or performed during the hospital encounter of 02/03/20 (from the past 24 hour(s))   Rapid strep screen   Result Value Ref Range    Specimen Description Throat     Rapid Strep A Screen       NEGATIVE: No Group A streptococcal antigen detected by immunoassay, await culture report.   XR Chest 2 Views    Narrative    EXAM: XR CHEST 2 VW  LOCATION: [facility]  DATE/TIME: 2/3/2020 1:15 AM    INDICATION: Cough. Shortness of air.  COMPARISON: None.    FINDINGS: The heart size is normal. There is a small left posterior basilar infiltrate. The lungs are otherwise clear. No pneumothorax or pleural effusion.      Impression    IMPRESSION:   Left basilar pneumonia.   XR Neck Soft Tissue    Narrative    EXAM: XR NECK SOFT TISSUE  LOCATION: [facility]  DATE/TIME: 2/3/2020 1:15 AM    INDICATION: Cough and sore throat.  COMPARISON: None.      Impression    IMPRESSION: The prevertebral soft tissues are within normal limits. The epiglottis is also within normal limits. There is mild narrowing of the subglottic airway. The bony structures are unremarkable.       Medications   dexamethasone (DECADRON) PF oral solution (inj used orally) 10 mg (10 mg Oral Given 2/3/20 0159)       Assessments & Plan      I have reviewed the nursing notes.    I have reviewed the findings, diagnosis, plan and need for follow up with the patient.       New Prescriptions    AMOXICILLIN (AMOXIL) 500 MG CAPSULE    Take 2 capsules (1,000 mg) by mouth 3 times daily for 10 days    AZITHROMYCIN (ZITHROMAX Z-AIDA) 250 MG TABLET    2 tabs on Day 1, then 1 tab daily on Days 2-5       Final diagnoses:   Pneumonia of left lower lobe due to infectious organism (H)   Pharyngitis, unspecified etiology       2/3/2020   [facility] EMERGENCY DEPARTMENT     Tato Mar MD  02/03/20 0204

## 2020-02-05 LAB
BACTERIA SPEC CULT: NORMAL
SPECIMEN SOURCE: NORMAL

## 2020-03-11 ENCOUNTER — HEALTH MAINTENANCE LETTER (OUTPATIENT)
Age: 25
End: 2020-03-11

## 2020-07-20 ENCOUNTER — VIRTUAL VISIT (OUTPATIENT)
Dept: FAMILY MEDICINE | Facility: CLINIC | Age: 25
End: 2020-07-20
Payer: COMMERCIAL

## 2020-07-20 DIAGNOSIS — F41.9 ANXIETY: Primary | ICD-10-CM

## 2020-07-20 PROCEDURE — 99214 OFFICE O/P EST MOD 30 MIN: CPT | Mod: TEL | Performed by: FAMILY MEDICINE

## 2020-07-20 PROCEDURE — 96127 BRIEF EMOTIONAL/BEHAV ASSMT: CPT | Performed by: FAMILY MEDICINE

## 2020-07-20 RX ORDER — LORAZEPAM 1 MG/1
.5-1 TABLET ORAL EVERY 6 HOURS PRN
Qty: 15 TABLET | Refills: 0 | Status: SHIPPED | OUTPATIENT
Start: 2020-07-20

## 2020-07-20 RX ORDER — DESVENLAFAXINE 50 MG/1
50 TABLET, FILM COATED, EXTENDED RELEASE ORAL DAILY
Qty: 30 TABLET | Refills: 1 | Status: SHIPPED | OUTPATIENT
Start: 2020-07-20 | End: 2020-08-03

## 2020-07-20 ASSESSMENT — ANXIETY QUESTIONNAIRES
6. BECOMING EASILY ANNOYED OR IRRITABLE: NEARLY EVERY DAY
1. FEELING NERVOUS, ANXIOUS, OR ON EDGE: NEARLY EVERY DAY
2. NOT BEING ABLE TO STOP OR CONTROL WORRYING: MORE THAN HALF THE DAYS
7. FEELING AFRAID AS IF SOMETHING AWFUL MIGHT HAPPEN: SEVERAL DAYS
5. BEING SO RESTLESS THAT IT IS HARD TO SIT STILL: MORE THAN HALF THE DAYS
IF YOU CHECKED OFF ANY PROBLEMS ON THIS QUESTIONNAIRE, HOW DIFFICULT HAVE THESE PROBLEMS MADE IT FOR YOU TO DO YOUR WORK, TAKE CARE OF THINGS AT HOME, OR GET ALONG WITH OTHER PEOPLE: VERY DIFFICULT
GAD7 TOTAL SCORE: 16
3. WORRYING TOO MUCH ABOUT DIFFERENT THINGS: NEARLY EVERY DAY

## 2020-07-20 ASSESSMENT — PATIENT HEALTH QUESTIONNAIRE - PHQ9
SUM OF ALL RESPONSES TO PHQ QUESTIONS 1-9: 9
5. POOR APPETITE OR OVEREATING: MORE THAN HALF THE DAYS

## 2020-07-20 NOTE — PROGRESS NOTES
"Neda Morley is a 25 year old female who is being evaluated via a billable telephone visit.      The patient has been notified of following:     \"This telephone visit will be conducted via a call between you and your physician/provider. We have found that certain health care needs can be provided without the need for a physical exam.  This service lets us provide the care you need with a short phone conversation.  If a prescription is necessary we can send it directly to your pharmacy.  If lab work is needed we can place an order for that and you can then stop by our lab to have the test done at a later time.    Telephone visits are billed at different rates depending on your insurance coverage. During this emergency period, for some insurers they may be billed the same as an in-person visit.  Please reach out to your insurance provider with any questions.    If during the course of the call the physician/provider feels a telephone visit is not appropriate, you will not be charged for this service.\"    Patient has given verbal consent for Telephone visit?  Yes    What phone number would you like to be contacted at? [phone]    How would you like to obtain your AVS? [place]    Subjective     Neda Morley is a 25 year old female who presents via phone visit today for the following health issues:    HPI    Anxiety Follow-Up    How are you doing with your anxiety since your last visit? Worsened Patient is working full-time, finishing graduate school, planning a wedding all of this at the time of COVID.  She finds her self using alcohol perhaps daily to quiet herself down.  She knows this is a poor choice.  In the past she was on Lexapro but she does not like to take routine medication.  She would be interested in routine for right now.  Lexapro caused weight gain which she is not interested in.  Wellbutrin increased her migraines.  She describes true panic attacks with heart racing short of breath " "sweating and just on being able to slow her mind.  This is affecting sleep.    Are you having other symptoms that might be associated with anxiety? Yes:  Panic Attacks    Have you had a significant life event? OTHER: \"just a lot going on\".     Are you feeling depressed? No    Do you have any concerns with your use of alcohol or other drugs? No    Social History     Tobacco Use     Smoking status: Never Smoker     Smokeless tobacco: Never Used   Substance Use Topics     Alcohol use: Yes     Comment: socially     Drug use: No     No flowsheet data found.  No flowsheet data found.  Last PHQ-9 7/20/2020   1.  Little interest or pleasure in doing things 1   2.  Feeling down, depressed, or hopeless 0   3.  Trouble falling or staying asleep, or sleeping too much 2   4.  Feeling tired or having little energy 3   5.  Poor appetite or overeating 0   6.  Feeling bad about yourself 2   7.  Trouble concentrating 1   8.  Moving slowly or restless 0   Q9: Thoughts of better off dead/self-harm past 2 weeks 0   PHQ-9 Total Score 9   Difficulty at work, home, or with people Very difficult     ALFRED-7  7/20/2020   1. Feeling nervous, anxious, or on edge 3   2. Not being able to stop or control worrying 2   3. Worrying too much about different things 3   4. Trouble relaxing 2   5. Being so restless that it is hard to sit still 2   6. Becoming easily annoyed or irritable 3   7. Feeling afraid, as if something awful might happen 1   ALFRED-7 Total Score 16   If you checked any problems, how difficult have they made it for you to do your work, take care of things at home, or get along with other people? Very difficult               Patient Active Problem List   Diagnosis     PCOS (polycystic ovarian syndrome)     Anxiety     Oral contraceptive pill surveillance     Class 1 obesity due to excess calories without serious comorbidity with body mass index (BMI) of 30.0 to 30.9 in adult     History reviewed. No pertinent surgical history.    Social " History     Tobacco Use     Smoking status: Never Smoker     Smokeless tobacco: Never Used   Substance Use Topics     Alcohol use: Yes     Comment: socially     History reviewed. No pertinent family history.      Current Outpatient Medications   Medication Sig Dispense Refill     desvenlafaxine (PRISTIQ) 50 MG 24 hr tablet Take 1 tablet (50 mg) by mouth daily 30 tablet 1     levonorgestrel (MIRENA) 20 MCG/24HR IUD 1 each by Intrauterine route once       LORazepam (ATIVAN) 1 MG tablet Take 0.5-1 tablets (0.5-1 mg) by mouth every 6 hours as needed for anxiety 15 tablet 0     Allergies   Allergen Reactions     Sulfa Drugs Rash     BP Readings from Last 3 Encounters:   02/03/20 (!) 130/96   04/15/19 120/72   03/26/19 122/60    Wt Readings from Last 3 Encounters:   04/15/19 88.4 kg (194 lb 14.4 oz)   03/26/19 88 kg (194 lb 1.6 oz)   02/26/19 86.7 kg (191 lb 3.2 oz)                    Reviewed and updated as needed this visit by Provider         Review of Systems   Constitutional, HEENT, cardiovascular, pulmonary, gi and gu systems are negative, except as otherwise noted.       Objective   Reported vitals:  There were no vitals taken for this visit.   healthy, alert and no distress  PSYCH: Alert and oriented times 3; coherent speech, normal   rate and volume, able to articulate logical thoughts, able   to abstract reason, no tangential thoughts, no hallucinations   or delusions  Her affect is normal  RESP: No cough, no audible wheezing, able to talk in full sentences  Remainder of exam unable to be completed due to telephone visits    Diagnostic Test Results:  none         Assessment/Plan:    1. Anxiety  I think Pristiq would be a good choice given previous medications.  Current dose ordered may be sufficient.  She will discuss this with Dr. Denny at her upcoming exam in about 2 weeks.  Ativan is ordered as a as needed but patient is counseled that this works similar to alcohol and could lead to dependence as well.   However intermittent use with bad attacks certainly makes sense.  She will only pick this up if Pristiq is not helping.  - desvenlafaxine (PRISTIQ) 50 MG 24 hr tablet; Take 1 tablet (50 mg) by mouth daily  Dispense: 30 tablet; Refill: 1  - LORazepam (ATIVAN) 1 MG tablet; Take 0.5-1 tablets (0.5-1 mg) by mouth every 6 hours as needed for anxiety  Dispense: 15 tablet; Refill: 0    Return in about 4 weeks (around 8/17/2020) for mental health.      Phone call duration: 17 minutes 8:38 AM to 8:55 AM  Nadeem Coleman MD

## 2020-07-20 NOTE — PATIENT INSTRUCTIONS
Begin ángela venlafaxine 50 mg once daily in a.m.  Tremor and headache are more common side effects than anything else.  Generally is well-tolerated and side effects diminished greatly in the first week.  This should help anxiety.  Follow-up with Dr. Denny as planned early in August but certainly let us know in between times if things are not working.    I did order lorazepam/Ativan 1 mg tablets that you could request from the pharmacy at any time.  Take 1/2 to 1 tablet if you are having a severe anxiety attack.  You are correct that alcohol is not the solution for this anxiety.

## 2020-07-21 ASSESSMENT — ANXIETY QUESTIONNAIRES: GAD7 TOTAL SCORE: 16

## 2020-08-03 ENCOUNTER — OFFICE VISIT (OUTPATIENT)
Dept: FAMILY MEDICINE | Facility: CLINIC | Age: 25
End: 2020-08-03
Payer: COMMERCIAL

## 2020-08-03 VITALS
WEIGHT: 206 LBS | RESPIRATION RATE: 14 BRPM | HEIGHT: 66 IN | SYSTOLIC BLOOD PRESSURE: 122 MMHG | TEMPERATURE: 97.7 F | OXYGEN SATURATION: 98 % | HEART RATE: 89 BPM | DIASTOLIC BLOOD PRESSURE: 60 MMHG | BODY MASS INDEX: 33.11 KG/M2

## 2020-08-03 DIAGNOSIS — Z13.6 CARDIOVASCULAR SCREENING; LDL GOAL LESS THAN 160: ICD-10-CM

## 2020-08-03 DIAGNOSIS — Z00.00 ROUTINE GENERAL MEDICAL EXAMINATION AT A HEALTH CARE FACILITY: Primary | ICD-10-CM

## 2020-08-03 DIAGNOSIS — F41.9 ANXIETY: ICD-10-CM

## 2020-08-03 DIAGNOSIS — Z11.3 SCREEN FOR STD (SEXUALLY TRANSMITTED DISEASE): ICD-10-CM

## 2020-08-03 DIAGNOSIS — Z12.4 SCREENING FOR MALIGNANT NEOPLASM OF CERVIX: ICD-10-CM

## 2020-08-03 PROCEDURE — 87491 CHLMYD TRACH DNA AMP PROBE: CPT | Performed by: FAMILY MEDICINE

## 2020-08-03 PROCEDURE — G0145 SCR C/V CYTO,THINLAYER,RESCR: HCPCS | Performed by: FAMILY MEDICINE

## 2020-08-03 PROCEDURE — 87591 N.GONORRHOEAE DNA AMP PROB: CPT | Performed by: FAMILY MEDICINE

## 2020-08-03 PROCEDURE — 99395 PREV VISIT EST AGE 18-39: CPT | Performed by: FAMILY MEDICINE

## 2020-08-03 RX ORDER — DESVENLAFAXINE 50 MG/1
50 TABLET, FILM COATED, EXTENDED RELEASE ORAL DAILY
Qty: 90 TABLET | Refills: 1 | Status: SHIPPED | OUTPATIENT
Start: 2020-08-03 | End: 2021-03-29

## 2020-08-03 ASSESSMENT — MIFFLIN-ST. JEOR: SCORE: 1706.54

## 2020-08-03 ASSESSMENT — PATIENT HEALTH QUESTIONNAIRE - PHQ9: SUM OF ALL RESPONSES TO PHQ QUESTIONS 1-9: 12
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
## 2020-08-03 NOTE — PATIENT INSTRUCTIONS
I sent in refills for your Pristiq (desvenlafaxine) for 6 months.   As long as it's working well, then stay on that dose and we should touch base again in 6 months.     I placed orders for a cholesterol panel, you can have that done at your convenience. If you don't have it done by next year, we can do it at your next physical.     I'll notify you with results from today's pap smear and STD screening tests.     If things change sooner please let me know.     Enjoy your wedding and congratulations!      Preventive Health Recommendations  Female Ages 21 to 25     Yearly exam:     See your health care provider every year in order to  o Review health changes.   o Discuss preventive care.    o Review your medicines if your doctor has prescribed any.      You should be tested each year for STDs (sexually transmitted diseases).       Talk to your provider about how often you should have cholesterol testing.      Get a Pap test every three years. If you have an abnormal result, your doctor may have you test more often.      If you are at risk for diabetes, you should have a diabetes test (fasting glucose).     Shots:     Get a flu shot each year.     Get a tetanus shot every 10 years.     Consider getting the shot (vaccine) that prevents cervical cancer (Gardasil).    Nutrition:     Eat at least 5 servings of fruits and vegetables each day.    Eat whole-grain bread, whole-wheat pasta and brown rice instead of white grains and rice.    Get adequate Calcium and Vitamin D.     Lifestyle    Exercise at least 150 minutes a week each week (30 minutes a day, 5 days a week). This will help you control your weight and prevent disease.    Limit alcohol to one drink per day.    No smoking.     Wear sunscreen to prevent skin cancer.    See your dentist every six months for an exam and cleaning.

## 2020-08-05 LAB
COPATH REPORT: NORMAL
PAP: NORMAL

## 2020-12-13 ENCOUNTER — NURSE TRIAGE (OUTPATIENT)
Dept: NURSING | Facility: CLINIC | Age: 25
End: 2020-12-13

## 2020-12-14 ENCOUNTER — OFFICE VISIT (OUTPATIENT)
Dept: FAMILY MEDICINE | Facility: CLINIC | Age: 25
End: 2020-12-14
Payer: COMMERCIAL

## 2020-12-14 VITALS
BODY MASS INDEX: 32.72 KG/M2 | HEART RATE: 94 BPM | DIASTOLIC BLOOD PRESSURE: 64 MMHG | OXYGEN SATURATION: 99 % | RESPIRATION RATE: 16 BRPM | TEMPERATURE: 97.8 F | SYSTOLIC BLOOD PRESSURE: 112 MMHG | WEIGHT: 208.5 LBS | HEIGHT: 67 IN

## 2020-12-14 DIAGNOSIS — R42 VERTIGO: Primary | ICD-10-CM

## 2020-12-14 PROCEDURE — 99213 OFFICE O/P EST LOW 20 MIN: CPT | Performed by: FAMILY MEDICINE

## 2020-12-14 ASSESSMENT — PAIN SCALES - GENERAL: PAINLEVEL: NO PAIN (0)

## 2020-12-14 ASSESSMENT — MIFFLIN-ST. JEOR: SCORE: 1726.79

## 2020-12-14 NOTE — NURSING NOTE
Health Maintenance Due   Topic Date Due     HIV SCREENING  04/09/2011     HEPATITIS C SCREENING  04/09/2014     INFLUENZA VACCINE (1) 09/01/2020     Health Maintenance reviewed at today's visit patient asked to schedule/complete:   Immunizations:  Patient declined     Rebecca Santos, CMA

## 2020-12-14 NOTE — TELEPHONE ENCOUNTER
Call from , verbal consent received from patient.     Reports that patient unable to get out of bed.  Very dizzy and sweaty.     T - 97.9    Feels like she will throw up if she sits up.  Has vomited 3 times.  Last urination this morning.     Feels like room in spinning.     Mild headache today also.     Advised to see PCP within 24 hours. Transfer to scheduling.     Concetta Steward RN/[facility]    COVID 19 Nurse Triage Plan/Patient Instructions    Please be aware that novel coronavirus (COVID-19) may be circulating in the community. If you develop symptoms such as fever, cough, or SOB or if you have concerns about the presence of another infection including coronavirus (COVID-19), please contact your health care provider or visit [email or web address].     Disposition/Instructions    Virtual Visit with provider recommended. Reference Visit Selection Guide.    Thank you for taking steps to prevent the spread of this virus.  o Limit your contact with others.  o Wear a simple mask to cover your cough.  o Wash your hands well and often.    Resources    [facility]: About COVID-19: [email or web address]    CDC: What to Do If You're Sick: www.cdc.gov/coronavirus/2019-ncov/about/steps-when-sick.html    CDC: Ending Home Isolation: www.cdc.gov/coronavirus/2019-ncov/hcp/disposition-in-home-patients.html     CDC: Caring for Someone: www.cdc.gov/coronavirus/2019-ncov/if-you-are-sick/care-for-someone.html     [facility]: Interim Guidance for Hospital Discharge to Home: www.health.[facility].[place]/diseases/coronavirus/hcp/hospdischarge.pdf    [facility] clinical trials (COVID-19 research studies): clinicalaffairs.[facility]/umn-clinical-trials     Below are the COVID-19 hotlines at the [place] Department of Health ([facility]). Interpreters are available.   o For health questions: Call [phone] or [phone] (7 a.m. to 7 p.m.)  o For questions about schools and childcare: Call [phone]  or [phone] (7 a.m. to 7 p.m.)     Additional Information    Negative: [1] Weakness (i.e., paralysis, loss of muscle strength) of the face, arm or leg on one side of the body AND [2] sudden onset AND [3] present now    Negative: [1] Numbness (i.e., loss of sensation) of the face, arm or leg on one side of the body AND [2] sudden onset AND [3] present now    Negative: [1] Loss of speech or garbled speech AND [2] sudden onset AND [3] present now    Negative: Difficult to awaken or acting confused (e.g., disoriented, slurred speech)    Negative: Sounds like a life-threatening emergency to the triager    Negative: Followed a head injury    Negative: Followed an ear injury    Negative: Localized weakness or numbness is main symptom    Negative: Dizziness relates to riding in a car, going to an amusement park, etc.    Negative: [1] Dizziness is main symptom AND [2] NO spinning sensation (i.e., vertigo)    Negative: SEVERE dizziness (vertigo) (e.g., unable to walk without assistance)    Negative: [1] Dizziness (vertigo) present now AND [2] one or more stroke risk factors (i.e., hypertension, diabetes, prior stroke/TIA/heart attack)  (Exception: prior physician evaluation for this AND no different/worse than usual)    Negative: [1] Dizziness (vertigo) present now AND [2] age > 60  (Exception: prior physician evaluation for this AND no different/worse than usual)    Negative: Severe headache (e.g., excruciating)  (Exception: similar to previous migraines)    Negative: Patient sounds very sick or weak to the triager    Negative: Taking a medicine that could cause dizziness (e.g., phenytoin [Dilantin], carbamazepine [Tegretol], primidone [Mysoline])    Negative: [1] MODERATE dizziness (e.g., vertigo; feels very unsteady, interferes with normal activities) AND [2] has NOT been evaluated by physician for this    Negative: Earache    Vomiting occurs with dizziness    Protocols used: DIZZINESS - VERTIGO-A-

## 2020-12-14 NOTE — PROGRESS NOTES
Subjective     Neda Morley is a 25 year old female who presents to clinic today for the following health issues:    HPI         Dizziness  Onset/Duration: 2 days  Description:   Do you feel faint: YES  Does it feel like the surroundings (bed, room) are moving: YES  Unsteady/off balance: YES  Have you passed out or fallen: YES  Intensity: at times it was severe, then moderate  Progression of Symptoms: improving today, comes and goes  Accompanying Signs & Symptoms:  Heart palpitations or chest pain: no  Nausea, vomiting: YES  Weakness or lack of coordination in arms or legs: YES- shaking yesterday  Vision or speech changes: YES- vision felt tilted and spinning  Numbness or tingling: no  Ringing in ears (Tinnitus): no  Hearing Loss: no  History:   Head trauma/concussion history: no  Previous similar symptoms: no  Recent bleeding history: no  Any new medications (BP?): no  Precipitating factors:   Worse with activity: YES  Worse with head movement: YES  Alleviating factors:   Does staying in a fixed position give relief: YES- little bit  Therapies tried and outcome: None    SUBJECTIVE:  Neda  is a 25 year old female who presents for: Dizziness symptoms as noted above.  Is a little better today.  Really able to drive here.  She works from home virtually so she does not of the drive.  She has had no injury to her head.  Denies headache.  No cold symptoms no sinus symptoms no tinnitus.  No hearing loss.    No past medical history on file.  No past surgical history on file.  Social History     Tobacco Use     Smoking status: Never Smoker     Smokeless tobacco: Never Used   Substance Use Topics     Alcohol use: Yes     Comment: socially     Current Outpatient Medications   Medication Sig Dispense Refill     desvenlafaxine (PRISTIQ) 50 MG 24 hr tablet Take 1 tablet (50 mg) by mouth daily 90 tablet 1     levonorgestrel (MIRENA) 20 MCG/24HR IUD 1 each by Intrauterine route once       LORazepam (ATIVAN) 1 MG tablet  "Take 0.5-1 tablets (0.5-1 mg) by mouth every 6 hours as needed for anxiety (Patient not taking: Reported on 12/14/2020) 15 tablet 0       REVIEW OF SYSTEMS:   5 point ROS negative except as noted above in HPI, including Gen., Resp, CV, GI &  system review.     OBJECTIVE:  Vitals: /64   Pulse 94   Temp 97.8  F (36.6  C) (Temporal)   Resp 16   Ht 1.699 m (5' 6.9\")   Wt 94.6 kg (208 lb 8 oz)   SpO2 99%   Breastfeeding No   BMI 32.75 kg/m    BMI= Body mass index is 32.75 kg/m .  She is alert appears well.  Head is normocephalic.  Eyes PERRLA EOMs full no nystagmus.  Ears canals and TMs are normal.  Lungs are clear.  Heart regular rhythm no murmur.  DTRs are 3/5 bilaterally.  Romberg is steady.  Gait is steady.    ASSESSMENT:  Positional vertigo    PLAN:  Recommended Zyrtec 10 mg twice a day.  She will try to rest as much as possible.  Follow-up if symptoms begin to worsen again..  Consider maneuvers if the vertigo continues.        Cuco Chambers MD  [facility]              "

## 2021-01-03 ENCOUNTER — HEALTH MAINTENANCE LETTER (OUTPATIENT)
Age: 26
End: 2021-01-03

## 2021-01-19 ENCOUNTER — VIRTUAL VISIT (OUTPATIENT)
Dept: FAMILY MEDICINE | Facility: CLINIC | Age: 26
End: 2021-01-19
Payer: COMMERCIAL

## 2021-01-19 DIAGNOSIS — Z71.85 VACCINE COUNSELING: Primary | ICD-10-CM

## 2021-01-19 PROCEDURE — 99213 OFFICE O/P EST LOW 20 MIN: CPT | Mod: TEL | Performed by: FAMILY MEDICINE

## 2021-01-19 NOTE — PROGRESS NOTES
Neda is a 25 year old who is being evaluated via a billable telephone visit.      Telephone visit performed in lieu of an in-person visit due to current concerns regarding the current COVID-19 situation including social distancing and keeping at risk people safe.  Patient agreed to proceed with this visit due to these circumstances.     What phone number would you like to be contacted at? [phone]  How would you like to obtain your AVS? MyChart  Assessment & Plan       ICD-10-CM    1. Vaccine counseling  Z71.89       I did advise her that I think the vaccine is safe. I acknowledged that it is a new vaccine and long term risk/benefit is not yet known, but the available research indicates that it should be offered to women who are pregnant and/or breastfeeding if they would otherwise be advised to vaccinate.   Her risk of future childbearing should not be affected by her choice to receive the vaccine now.   We also discussed the risk of COVID is higher in women who are pregnant and in women who have higher risk for complications from obesity, cardiac or metabolic disease, HTN, etc.   I do recommend the vaccine.     8 minutes spent on the date of the encounter doing chart review, history and exam, documentation and further activities as noted above   No follow-ups on file.    Mis Denny MD  [facility]    Sujey Red is a 25 year old who presents to clinic today for the following health issues:    HPI       Patient has questions about covid vaccine wondering if should get it also would like more information on vaccine  She has a previous diagnosis of PCOS, and would like to try to conceive in the next few years. She has heard that the vaccine might impair future fertility, would like to know if this is true and if her diagnosis of PCOS will affect her decision to take the vaccine or not.     Review of Systems   Constitutional, HEENT, cardiovascular, pulmonary,  gi and gu systems are negative, except as otherwise noted.      Objective           Vitals:  No vitals were obtained today due to virtual visit.    Physical Exam   healthy, alert and no distress  PSYCH: Alert and oriented times 3; coherent speech, normal   rate and volume, able to articulate logical thoughts, able   to abstract reason, no tangential thoughts, no hallucinations   or delusions  Her affect is normal and pleasant  RESP: No cough, no audible wheezing, able to talk in full sentences  Remainder of exam unable to be completed due to telephone visits      Phone call duration: 8 minutes

## 2021-03-27 DIAGNOSIS — F41.9 ANXIETY: ICD-10-CM

## 2021-03-27 NOTE — LETTER
[address] [address]  Phone: [phone]  Fax: [phone]        March 31, 2021      Neda Morley                                                                                                                                [address] [facility] [address]            Dear Ms. Morley,    We are concerned about your health care.  We recently provided you with a medication refill.  Many medications require routine follow-up with your Doctor.      At this time we ask that: You schedule a routine office visit with your physician to follow your care. Please call the clinic @ [phone] option 1 to set up a follow up appointment before August.     Your prescription: Has been refilled for 3 month so you may have time for the above noted follow-up.      Thank you,      Mis Denny MD   Care Team

## 2021-03-29 NOTE — TELEPHONE ENCOUNTER
"Routing refill request to provider for review/approval because:  Labs not current:  CR  T'd up 1 month for provider review.    Requested Prescriptions   Pending Prescriptions Disp Refills     desvenlafaxine (PRISTIQ) 50 MG 24 hr tablet [Pharmacy Med Name: DESVENLAFAXINE SUCC ER 50MG] 90 tablet 1     Sig: TAKE ONE TABLET BY MOUTH ONCE DAILY   Last Written Prescription Date:  8/3/2020  Last Fill Quantity: 90,  # refills: 1   Last office visit: 1/19/2021with prescribing provider:     Future Office Visit:        Serotonin-Norepinephrine Reuptake Inhibitors  Failed - 3/27/2021 11:23 AM        Failed - Normal serum creatinine on file in past 12 months     No lab results found.    Ok to refill medication if creatinine is low          Passed - Blood pressure under 140/90 in past 12 months     BP Readings from Last 3 Encounters:   12/14/20 112/64   08/03/20 122/60   02/03/20 (!) 130/96           Passed - Recent (12 mo) or future (30 days) visit within the authorizing provider's specialty     Patient has had an office visit with the authorizing provider or a provider within the authorizing providers department within the previous 12 mos or has a future within next 30 days. See \"Patient Info\" tab in inbasket, or \"Choose Columns\" in Meds & Orders section of the refill encounter.          Passed - Medication is active on med list        Passed - Patient is age 18 or older        Passed - No active pregnancy on record        Passed - No positive pregnancy test in past 12 months         Doreen Lopez RN    "

## 2021-03-31 RX ORDER — DESVENLAFAXINE 50 MG/1
TABLET, FILM COATED, EXTENDED RELEASE ORAL
Qty: 90 TABLET | Refills: 1 | Status: SHIPPED | OUTPATIENT
Start: 2021-03-31 | End: 2021-12-02

## 2021-10-10 ENCOUNTER — HEALTH MAINTENANCE LETTER (OUTPATIENT)
Age: 26
End: 2021-10-10

## 2021-12-02 DIAGNOSIS — F41.9 ANXIETY: ICD-10-CM

## 2021-12-02 NOTE — LETTER
[address] [address]  [phone]        December 9, 2021    Neda Morley  [address] [address] [facility] [address]              Jose Red,    We have refilled your desvenlafaxine. You are due for an office visit for a physical and medication recheck before further refills will be approved. Please schedule an appointment.      Sincerely,    Mis Denny MD

## 2021-12-03 NOTE — TELEPHONE ENCOUNTER
Pending Prescriptions:                       Disp   Refills    desvenlafaxine (PRISTIQ) 50 MG 24 hr [id number] tab*1        Sig: Take 1 tablet (50 mg) by mouth daily      Routing refill request to provider for review/approval because:  Labs not current:  creatinine    Melissa Jesus RN on 12/3/2021 at 8:45 AM

## 2021-12-05 RX ORDER — DESVENLAFAXINE 50 MG/1
50 TABLET, FILM COATED, EXTENDED RELEASE ORAL DAILY
Qty: 90 TABLET | Refills: 0 | Status: SHIPPED | OUTPATIENT
Start: 2021-12-05

## 2021-12-09 NOTE — TELEPHONE ENCOUNTER
I have written and printed a letter to the pt with the information below. Liz Lazaro CMA ([facility])

## 2022-09-18 ENCOUNTER — HEALTH MAINTENANCE LETTER (OUTPATIENT)
Age: 27
End: 2022-09-18

## 2023-01-28 ENCOUNTER — HEALTH MAINTENANCE LETTER (OUTPATIENT)
Age: 28
End: 2023-01-28

## 2024-02-25 ENCOUNTER — HEALTH MAINTENANCE LETTER (OUTPATIENT)
Age: 29
End: 2024-02-25